# Patient Record
Sex: MALE | Race: WHITE | URBAN - METROPOLITAN AREA
[De-identification: names, ages, dates, MRNs, and addresses within clinical notes are randomized per-mention and may not be internally consistent; named-entity substitution may affect disease eponyms.]

---

## 2024-07-17 ENCOUNTER — OFFICE VISIT (OUTPATIENT)
Dept: FAMILY MEDICINE CLINIC | Facility: CLINIC | Age: 44
End: 2024-07-17
Payer: COMMERCIAL

## 2024-07-17 VITALS
BODY MASS INDEX: 25.65 KG/M2 | WEIGHT: 179.19 LBS | HEIGHT: 70 IN | TEMPERATURE: 98 F | SYSTOLIC BLOOD PRESSURE: 122 MMHG | HEART RATE: 98 BPM | DIASTOLIC BLOOD PRESSURE: 80 MMHG | OXYGEN SATURATION: 99 %

## 2024-07-17 DIAGNOSIS — Z71.85 IMMUNIZATION COUNSELING: ICD-10-CM

## 2024-07-17 DIAGNOSIS — Z12.11 SCREENING FOR COLON CANCER: ICD-10-CM

## 2024-07-17 DIAGNOSIS — R22.9 LOCALIZED SKIN MASS, LUMP, OR SWELLING: ICD-10-CM

## 2024-07-17 DIAGNOSIS — R06.02 SOB (SHORTNESS OF BREATH): ICD-10-CM

## 2024-07-17 DIAGNOSIS — Z00.00 ROUTINE MEDICAL EXAM: Primary | ICD-10-CM

## 2024-07-17 PROCEDURE — 3008F BODY MASS INDEX DOCD: CPT | Performed by: STUDENT IN AN ORGANIZED HEALTH CARE EDUCATION/TRAINING PROGRAM

## 2024-07-17 PROCEDURE — 3074F SYST BP LT 130 MM HG: CPT | Performed by: STUDENT IN AN ORGANIZED HEALTH CARE EDUCATION/TRAINING PROGRAM

## 2024-07-17 PROCEDURE — 3079F DIAST BP 80-89 MM HG: CPT | Performed by: STUDENT IN AN ORGANIZED HEALTH CARE EDUCATION/TRAINING PROGRAM

## 2024-07-17 PROCEDURE — 99204 OFFICE O/P NEW MOD 45 MIN: CPT | Performed by: STUDENT IN AN ORGANIZED HEALTH CARE EDUCATION/TRAINING PROGRAM

## 2024-07-17 PROCEDURE — 99386 PREV VISIT NEW AGE 40-64: CPT | Performed by: STUDENT IN AN ORGANIZED HEALTH CARE EDUCATION/TRAINING PROGRAM

## 2024-07-17 NOTE — PROGRESS NOTES
Subjective:   Harry Souza is a 43 year old male who presents for Mass (Right side lump )     43-year-old male coming in due to noticing lumps on body and would complete routine physical.  Has not seen a PCP for few years.  Mentions a few months ago noted a lump on the right lateral abdominal area (midclavicular line) as well as left lateral abdomen and right distal anterior thigh.  The nodule on the thigh was noted several years ago with no change in size or consistency.    Separately patient mentions noticing mild shortness of breath while playing pickle ball.  Symptoms started after having COVID 2 years ago.  No SOB when walking dog at night.  Denies pressure-like chest pain, history of heart disease.      History/Other:    Chief Complaint Reviewed and Verified  Nursing Notes Reviewed and   Verified  Allergies Reviewed  Medications Reviewed  Problem List   Reviewed  Medical History Reviewed  Surgical History Reviewed  Family   History Reviewed         Tobacco:  He has never smoked tobacco.    No current outpatient medications on file.         Review of Systems:  Review of Systems   Constitutional:  Negative for chills, diaphoresis and fever.   HENT:  Negative for congestion, ear discharge, ear pain, sinus pressure, sinus pain and sore throat.    Eyes:  Negative for pain and discharge.   Respiratory:  Positive for shortness of breath. Negative for cough, chest tightness and wheezing.    Cardiovascular:  Negative for chest pain and palpitations.   Gastrointestinal:  Negative for abdominal pain, diarrhea, nausea and vomiting.   Endocrine: Negative for cold intolerance and heat intolerance.   Genitourinary:  Negative for dysuria, flank pain, frequency and urgency.   Musculoskeletal:  Negative for joint swelling.        Lumps on body.   Skin:  Negative for rash.   Neurological:  Negative for dizziness, syncope and headaches.   Psychiatric/Behavioral:  Negative for confusion and hallucinations.         Objective:   /80   Pulse 98   Temp 97.6 °F (36.4 °C)   Ht 5' 10\" (1.778 m)   Wt 179 lb 3.2 oz (81.3 kg)   SpO2 99%   BMI 25.71 kg/m²  Estimated body mass index is 25.71 kg/m² as calculated from the following:    Height as of this encounter: 5' 10\" (1.778 m).    Weight as of this encounter: 179 lb 3.2 oz (81.3 kg).  Physical Exam  Constitutional:       General: He is not in acute distress.     Appearance: Normal appearance. He is not ill-appearing or toxic-appearing.   HENT:      Head: Normocephalic and atraumatic.      Right Ear: Tympanic membrane and ear canal normal.      Left Ear: Tympanic membrane and ear canal normal.      Mouth/Throat:      Mouth: Mucous membranes are moist.      Pharynx: Oropharynx is clear. No oropharyngeal exudate or posterior oropharyngeal erythema.   Eyes:      Extraocular Movements: Extraocular movements intact.      Pupils: Pupils are equal, round, and reactive to light.   Cardiovascular:      Rate and Rhythm: Normal rate and regular rhythm.      Heart sounds: Normal heart sounds. No murmur heard.     No gallop.   Pulmonary:      Effort: Pulmonary effort is normal. No respiratory distress.      Breath sounds: Normal breath sounds. No stridor. No wheezing, rhonchi or rales.   Abdominal:      General: Bowel sounds are normal.      Palpations: Abdomen is soft.      Tenderness: There is no abdominal tenderness. There is no right CVA tenderness, left CVA tenderness or guarding.      Comments: Small hard lump on right lateral abdomen midclavicular line.  Small hard lump in the left lateral abdominal area.  Small lump on distal right anterior thigh.   Musculoskeletal:         General: No swelling.      Cervical back: Normal range of motion and neck supple. No rigidity or tenderness.      Right lower leg: No edema.      Left lower leg: No edema.   Skin:     General: Skin is warm and dry.   Neurological:      General: No focal deficit present.      Mental Status: He is alert  and oriented to person, place, and time. Mental status is at baseline.      Cranial Nerves: No cranial nerve deficit.      Sensory: No sensory deficit.      Motor: Motor function is intact. No weakness.      Gait: Gait normal.   Psychiatric:         Mood and Affect: Mood normal.         Behavior: Behavior normal.         Thought Content: Thought content normal.         Judgment: Judgment normal.         Assessment & Plan:     1. Routine medical exam (Primary)  -Healthy diet and lifestyle.  -Weight loss.  -Exercise as tolerated.  -Dental exam every 6 months or as recommended by dentist.  -Eye exams annually, at least every 2 years or as recommended by specialist.  -     CBC, Platelet; No Differential; Future; Expected date: 07/17/2024  -     Comp Metabolic Panel (14); Future; Expected date: 07/17/2024  -     Hemoglobin A1C; Future; Expected date: 07/17/2024  -     Lipid Panel; Future; Expected date: 07/17/2024  -     TSH W Reflex To Free T4; Future; Expected date: 07/17/2024  2. SOB (shortness of breath)  Shortness of breath while playing pickle ball.  Symptoms started after having COVID 2 years ago.  -Counseled about worrisome signs and symptoms that would prompt going to ED  -     CARD TREADMILL STRESS, ADULT (CPT=93017); Future; Expected date: 07/17/2024  -     XR CHEST PA + LAT CHEST (CPT=71046); Future; Expected date: 07/17/2024  3. Localized skin mass, lump, or swelling  Lipoma vs cyst vs lymph nodes vs other pathologies  -     US ABDOMEN COMPLETE (CPT=76700); Future; Expected date: 07/17/2024  4. Screening for colon cancer  -     GASTRO - INTERNAL  5. Immunization counseling  Discussed Tdap vaccination.  Patient will check records and follow-up.        Return in about 1 year (around 7/17/2025).  For physical.  Follow-up in 2 months pending results.    Payam Sarmiento MD, 7/17/2024, 3:33 PM